# Patient Record
Sex: FEMALE | Race: WHITE
[De-identification: names, ages, dates, MRNs, and addresses within clinical notes are randomized per-mention and may not be internally consistent; named-entity substitution may affect disease eponyms.]

---

## 2018-07-08 ENCOUNTER — HOSPITAL ENCOUNTER (EMERGENCY)
Dept: HOSPITAL 33 - ED | Age: 38
Discharge: HOME | End: 2018-07-08
Payer: COMMERCIAL

## 2018-07-08 VITALS — HEART RATE: 81 BPM | DIASTOLIC BLOOD PRESSURE: 83 MMHG | OXYGEN SATURATION: 100 % | SYSTOLIC BLOOD PRESSURE: 132 MMHG

## 2018-07-08 DIAGNOSIS — S93.401A: Primary | ICD-10-CM
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
## 2018-07-08 PROCEDURE — 73630 X-RAY EXAM OF FOOT: CPT

## 2018-07-08 PROCEDURE — 99284 EMERGENCY DEPT VISIT MOD MDM: CPT

## 2018-07-08 PROCEDURE — 73610 X-RAY EXAM OF ANKLE: CPT

## 2018-07-08 PROCEDURE — 96372 THER/PROPH/DIAG INJ SC/IM: CPT

## 2018-07-08 NOTE — ERPHSYRPT
- History of Present Illness


Time Seen by Provider: 07/08/18 19:00


Source: patient


Exam Limitations: no limitations


Patient Subjective Stated Complaint: pt was test driving a 4 agudelo and put 

right foot down-reports pain and swelling-denies numbness or tingling


Triage Nursing Assessment: pt pink warm and dry-alert-swelling noted to right 

foot/ankle area-pedal pulse regular


Physician History: 





39 y/o female comes to the ER with complaints of right ankle pain after testing 

a 4 X 4 agudelo for her daughter and twisted her right ankle. Pt states she was 

not able to put any weight on it. Pt describes the pain as sharp, constant, 10/

10, worse with movement and pt has not taken any pain meds.


Method of Injury: twisted


Occurred: just prior to arrival


Quality: constant


Severity of Pain-Max: severe


Severity of Pain-Current: severe


Lower Extremities Pain: ankle: right


Modifying Factors: Improves With: nothing


Associated Symptoms: none


Allergies/Adverse Reactions: 








Latex, Natural Rubber Allergy (Mild, Verified 07/08/18 18:20)


 Hives


 states "not severe" 





Home Medications: 








No Home Meds [No Home Meds****] 1 [facility] UD 10/14/16 [History]





Hx Tetanus, Diphtheria Vaccination/Date Given: Yes


Hx Influenza Vaccination/Date Given: No


Hx Pneumococcal Vaccination/Date Given: No


Immunizations Up to Date: Yes





- Review of Systems


Constitutional: No Fever, No Chills


Eyes: No Symptoms


Ears, Nose, & Throat: No Symptoms


Respiratory: No Cough, No Dyspnea


Cardiac: No Chest Pain, No Edema, No Syncope


Abdominal/Gastrointestinal: No Abdominal Pain, No Nausea, No Vomiting, No 

Diarrhea


Genitourinary Symptoms: No Dysuria


Musculoskeletal: Joint Pain, Joint Swelling, No Back Pain, No Neck Pain


Skin: No Rash


Neurological: No Dizziness, No Focal Weakness, No Sensory Changes


Psychological: No Symptoms


Endocrine: No Symptoms


All Other Systems: Reviewed and Negative





- Past Medical History


Pertinent Past Medical History: No


Neurological History: No Pertinent History


ENT History: No Pertinent History


Cardiac History: No Pertinent History


Respiratory History: No Pertinent History


Endocrine Medical History: Other


Musculoskeletal History: Degenerative Disk Disease


GI Medical History: Hemorrhoids, Other


 History: No Pertinent History


Psycho-Social History: Bipolar


Female Reproductive Disorders: Uterine Cancer


Other Medical History: states uterine cyst /cancerous removed yrs ago.





- Past Surgical History


Past Surgical History: Yes


Neuro Surgical History: No Pertinent History


Cardiac: No Pertinent History


Respiratory: No Pertinent History


Gastrointestinal: No Pertinent History


Musculoskeletal: No Pertinent History


Female Surgical History: No Pertinent History


Other Surgical History: LEEP cyst/cervix at age 20,Ear tubes left,Adenoidectomy,

D&C x five after five miscarriages,One live birth





- Social History


Smoking Status: Never smoker


Exposure to second hand smoke: No


Drug Use: none


Patient Lives Alone: No





- Female History


Hx Last Menstrual Period: 6/21/18


Hx Pregnant Now: No





- Nursing Vital Signs


Nursing Vital Signs: 


 Initial Vital Signs











Temperature  97.6 F   07/08/18 18:15


 


Pulse Rate  89   07/08/18 18:15


 


Respiratory Rate  18   07/08/18 18:15


 


Blood Pressure  145/97   07/08/18 18:15


 


O2 Sat by Pulse Oximetry  97   07/08/18 18:15








 Pain Scale











Pain Intensity                 7

















- Physical Exam


General Appearance: alert


Eyes, Ears, Nose, Throat Exam: moist mucous membranes


Neck Exam: non-tender, supple


Cardiovascular/Respiratory Exam: chest non-tender, normal breath sounds, 

regular rate/rhythm, no respiratory distress


Gastrointestinal/Abdominal Exam: non-tender, guarding


Back Exam: normal inspection, No vertebral tenderness


Hips Exam: bilateral: non-tender, normal inspection, normal range of motion, no 

evidence of injury


Legs Exam: bilateral leg: non-tender, normal inspection, normal range of motion

, no evidence of injury


Knees Exam: bilateral knee: non-tender, normal inspection, normal range of 

motion, no evidence of injury


Ankle Exam: right ankle: limited range of motion, pain, soft tissue tenderness, 

swelling


Foot Exam: right foot: limited range of motion, pain, soft tissue tenderness, 

swelling


Neuro/Tendon Exam: normal sensation, normal motor functions


Mental Status Exam: alert, oriented x 3, cooperative


Skin Exam: normal color, warm, dry


**SpO2 Interpretation**: normal


SpO2: 97


Oxygen Delivery: Room Air





- Course


Nursing assessment & vital signs reviewed: Yes


Ordered Tests: 


 Active Orders 24 hr











 Category Date Time Status


 


 [facility]  07/08/18 19:46 Ordered


 


 Crutches STAT Care  07/08/18 19:46 Ordered


 


 ANKLE (3 VIEWS) Stat Exams  07/08/18 18:46 Taken


 


 FOOT (MINIMUM 3 VIEWS) Stat Exams  07/08/18 18:46 Taken








Medication Summary











Generic Name Dose Route Start Last Admin





  Trade Name Garett  PRN Reason Stop Dose Admin


 


Hydrocodone Bitart/Acetaminophen  1 tab  07/08/18 19:47  





  Norco 5/325 Mg***  PO  07/08/18 19:48  





  STAT ONE   





     





     





     





     














Discontinued Medications














Generic Name Dose Route Start Last Admin





  Trade Name Garett  PRN Reason Stop Dose Admin


 


Ketorolac Tromethamine  60 mg  07/08/18 19:04  07/08/18 19:15





  Toradol 30 Mg Injection***  IM  07/08/18 19:05  60 mg





  STAT ONE   Administration





     





     





     





     


 


Ketorolac Tromethamine  Confirm  07/08/18 19:10  





  Toradol 30 Mg Injection***  Administered  07/08/18 19:11  





  Dose   





  60 mg   





  .ROUTE   





  .STK-MED ONE   





     





     





     





     














- Progress


Progress: unchanged


Progress Note: 





07/08/18 19:47


The ankle and foot x ray do not show any acute fracture or dislocation. Pt 

still has pain after receiving toradol. Pt will be giving a short course of 

norco. Pt will have an ace wrap and will be placed in crutches.





- Departure


Time of Disposition: 19:48


Departure Disposition: Home


Clinical Impression: 


Ankle sprain


Qualifiers:


 Encounter type: initial encounter Involved ligament of ankle: unspecified 

ligament Laterality: right Qualified Code(s): S93.401A - Sprain of unspecified 

ligament of right ankle, initial encounter





Condition: Stable


Critical Care Time: No


Referrals: 


MELINDA MCKENNA [Primary Care Provider] - 


Instructions:  Foot Sprain (DC)


Additional Instructions: 


Follow up with your primary care doctor in the next few days if there is no 

improvement.


Prescriptions: 


Hydrocodone Bit/Acetaminophen [Norco 5-325 Tablet] 1 each PO QID PRN #10 tablet 

MDD 4


 PRN Reason: Severe Pain


Ketorolac Tromethamine [Toradol] 10 mg PO QID PRN #20 tablet


 PRN Reason: Pain

## 2018-07-09 NOTE — XRAY
Exam: 3 views of the right foot from 7/8/2018.



Comparison: Right ankle films from 7/8/2018.



Indication: Rolled right ankle/foot today, swelling in ankle.



Findings: AP, oblique, and lateral regress of the right foot obtained.



There is at least moderate soft tissue swelling overlying the lateral aspect

of the right hindfoot on the AP image.  The lateral cortical margin of the

distal calcaneus is indistinct on the AP image.  The AP image of the right

ankle had suggested a cortical avulsion fracture at this level.  I also again

see a tiny calcification adjacent to the proximal dorsal aspect of the tarsal

navicular bone near the talonavicular joint.  This could possibly represent a

small cortical avulsion fracture injury at this site as well, as I see some

overlying soft tissue swelling within the dorsal aspect of the right hindfoot.

 A small accessory bone is also possible.



I see no other evidence of acute fracture or dislocation of the right foot.

The tarsal-metatarsal joint spaces align correctly.  There is a normal plantar

arch.  No radiopaque soft tissue foreign body is seen.  There is a minimal,

broad-based hypertrophic plantar calcaneal spur.



Impression:



1.  There is significant soft tissue swelling overlying the lateral aspect of

the right hindfoot, and to a lesser extent, the dorsal aspect of the right

hindfoot.  I again suspect a cortical avulsion fracture injury at the distal

lateral margin of the right calcaneus as well as perhaps at the posterior

margin of the talonavicular joint on the lateral image of the right hindfoot.



2.  No other fracture or dislocation of the right foot is suspected.



Note:  I personally discussed the images and findings with Dr. Pickens (Emergency

Department physician) at  9:25 AM on 7/9/2018.

## 2018-07-09 NOTE — XRAY
Exam: 3 view right ankle series from 7/8/2018.



Comparison: None.



Indication: Patient rolled right ankle/foot today, swelling and ankle.



Findings: AP oblique and lateral radiographs percent for evaluation.



The distal right tibia and fibula appear intact without fracture.  The right

ankle mortise appears unremarkable.  However, I note at least moderate soft

tissue swelling overlying the lateral malleolus and lateral right hindfoot.

On the AP image, I see a triangular calcification adjacent to the lateral

aspect of the right hindfoot which probably represents a cortical avulsion

fracture injury arising from the lateral margin of the right calcaneus.  In

addition, there is a subtle calcification adjacent to the dorsal aspect of the

talonavicular joint on the lateral image which may possibly be related to an

accessory ossicle or tiny avulsion fracture injury.  Some soft tissue swelling

is seen overlying the dorsal aspect of the right hindfoot as well.  No other

abnormality is seen.



Impression:



1.  No acute fracture of the distal right tibia or fibula is seen.



2.  However, there appears to be a triangular cortical avulsion fracture

injury arising off the lateral margin of the right hindfoot (probably the

calcaneus) measuring about 6.8 mm x 3.9 mm in greatest cross-section.  This is

best seen on AP image.  There is also equivocal evidence of a tiny cortical

avulsion fracture at the posterior margin of the talonavicular joint.  I am

not sure whether this represents a small avulsion injury or accessory ossicle.

 However, there is some soft tissue swelling overlying the dorsal aspect of

the right hindfoot in the adjacent region.



Note.  I personally discussed the images and findings with the emergency

department doctor () at 9:25 AM on 7/9/2018.